# Patient Record
Sex: FEMALE | Race: OTHER | HISPANIC OR LATINO | Employment: FULL TIME | URBAN - METROPOLITAN AREA
[De-identification: names, ages, dates, MRNs, and addresses within clinical notes are randomized per-mention and may not be internally consistent; named-entity substitution may affect disease eponyms.]

---
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
## 2025-01-15 ENCOUNTER — OFFICE VISIT (OUTPATIENT)
Dept: URGENT CARE | Facility: PHYSICIAN GROUP | Age: 20
End: 2025-01-15
Payer: COMMERCIAL

## 2025-01-15 VITALS
SYSTOLIC BLOOD PRESSURE: 106 MMHG | DIASTOLIC BLOOD PRESSURE: 70 MMHG | RESPIRATION RATE: 12 BRPM | BODY MASS INDEX: 25.46 KG/M2 | OXYGEN SATURATION: 99 % | TEMPERATURE: 98.1 F | WEIGHT: 158.4 LBS | HEIGHT: 66 IN | HEART RATE: 91 BPM

## 2025-01-15 DIAGNOSIS — M79.10 MYALGIA DUE TO VIRAL INFECTION: ICD-10-CM

## 2025-01-15 DIAGNOSIS — R20.2 NUMBNESS AND TINGLING: ICD-10-CM

## 2025-01-15 DIAGNOSIS — B97.89 MYALGIA DUE TO VIRAL INFECTION: ICD-10-CM

## 2025-01-15 DIAGNOSIS — R20.0 NUMBNESS AND TINGLING: ICD-10-CM

## 2025-01-15 LAB
FLUAV RNA SPEC QL NAA+PROBE: NEGATIVE
FLUBV RNA SPEC QL NAA+PROBE: NEGATIVE
RSV RNA SPEC QL NAA+PROBE: NEGATIVE
S PYO DNA SPEC NAA+PROBE: NOT DETECTED
SARS-COV-2 RNA RESP QL NAA+PROBE: NEGATIVE

## 2025-01-15 PROCEDURE — 87651 STREP A DNA AMP PROBE: CPT

## 2025-01-15 PROCEDURE — 3074F SYST BP LT 130 MM HG: CPT

## 2025-01-15 PROCEDURE — 3078F DIAST BP <80 MM HG: CPT

## 2025-01-15 PROCEDURE — 0241U POCT CEPHEID COV-2, FLU A/B, RSV - PCR: CPT

## 2025-01-15 PROCEDURE — 99214 OFFICE O/P EST MOD 30 MIN: CPT

## 2025-01-15 RX ORDER — CYCLOBENZAPRINE HCL 5 MG
10 TABLET ORAL NIGHTLY PRN
Qty: 5 TABLET | Refills: 0 | Status: SHIPPED | OUTPATIENT
Start: 2025-01-15 | End: 2025-01-20

## 2025-01-15 ASSESSMENT — ENCOUNTER SYMPTOMS
JOINT SWELLING: 0
ABDOMINAL PAIN: 0
DIZZINESS: 0
PALPITATIONS: 0
FOCAL WEAKNESS: 0
TINGLING: 1
VERTIGO: 0
SHORTNESS OF BREATH: 0
COUGH: 0
VISUAL CHANGE: 0
CHANGE IN BOWEL HABIT: 0
WEAKNESS: 0
DIARRHEA: 0
MYALGIAS: 0
NUMBNESS: 0
EXTREMITY NUMBNESS: 1
HEADACHES: 1
DIAPHORESIS: 0
SENSORY CHANGE: 1
SORE THROAT: 0
LOSS OF CONSCIOUSNESS: 0
FALLS: 0
SWOLLEN GLANDS: 0
VOMITING: 0
ARTHRALGIAS: 0
BLURRED VISION: 0
FEVER: 0
NAUSEA: 0
FATIGUE: 0
CHILLS: 0
NECK PAIN: 0
ANOREXIA: 0

## 2025-01-15 NOTE — PROGRESS NOTES
Subjective:     Sarai Pepe is a 20 y.o. female who presents for Numbness (X3 days in left arm and left leg, denies injury)      Numbness  This is a new problem. The current episode started in the past 7 days. The problem occurs constantly. The problem has been unchanged. Associated symptoms include headaches. Pertinent negatives include no abdominal pain, anorexia, arthralgias, change in bowel habit, chest pain, chills, congestion, coughing, diaphoresis, fatigue, fever, joint swelling, myalgias, nausea, neck pain, numbness, rash, sore throat, swollen glands, urinary symptoms, vertigo, visual change, vomiting or weakness. Nothing aggravates the symptoms. She has tried nothing for the symptoms.       Review of Systems   Constitutional:  Negative for chills, diaphoresis, fatigue, fever and malaise/fatigue.   HENT:  Negative for congestion, ear discharge and sore throat.    Eyes:  Negative for blurred vision.   Respiratory:  Negative for cough and shortness of breath.    Cardiovascular:  Negative for chest pain, palpitations and leg swelling.   Gastrointestinal:  Negative for abdominal pain, anorexia, change in bowel habit, diarrhea, nausea and vomiting.   Musculoskeletal:  Negative for arthralgias, falls, joint swelling, myalgias and neck pain.   Skin:  Negative for itching and rash.   Neurological:  Positive for tingling, sensory change and headaches. Negative for dizziness, vertigo, focal weakness, loss of consciousness, weakness and numbness.        CURRENT MEDICATIONS:  azelastine  ofloxacin Soln  Tri-Lo-Oralia Tabs    Allergies:   No Known Allergies    Current Problems: Sarai Pepe does not have a problem list on file.  Past Surgical Hx:  No past surgical history on file.   Past Social Hx:  reports that she has never smoked. She has never used smokeless tobacco. She reports that she does not drink alcohol and does not use drugs.   Past Family Hx:  Sarai Pepe family history is  "not on file.     (Allergies, Medications, & Tobacco/Substance Use were reconciled by the Medical Assistant and reviewed by myself. The family history is prepopulated)       Objective:     /70 (BP Location: Right arm, Patient Position: Sitting, BP Cuff Size: Adult)   Pulse 91   Temp 36.7 °C (98.1 °F)   Resp 12   Ht 1.676 m (5' 6\")   Wt 71.8 kg (158 lb 6.4 oz)   SpO2 99%   BMI 25.57 kg/m²     Physical Exam  Constitutional:       General: She is not in acute distress.     Appearance: Normal appearance. She is not ill-appearing, toxic-appearing or diaphoretic.   HENT:      Head: Normocephalic and atraumatic.      Nose: Nose normal.      Mouth/Throat:      Pharynx: Oropharyngeal exudate and posterior oropharyngeal erythema present.   Eyes:      General: No visual field deficit or scleral icterus.        Right eye: No discharge.         Left eye: No discharge.   Cardiovascular:      Rate and Rhythm: Normal rate and regular rhythm.      Heart sounds: Normal heart sounds. No murmur heard.     No friction rub. No gallop.   Pulmonary:      Effort: Pulmonary effort is normal. No respiratory distress.      Breath sounds: No stridor. No wheezing, rhonchi or rales.   Chest:      Chest wall: No tenderness.   Abdominal:      General: Abdomen is flat.      Palpations: Abdomen is soft.   Musculoskeletal:         General: Normal range of motion.      Cervical back: No rigidity.   Skin:     General: Skin is warm and dry.   Neurological:      General: No focal deficit present.      Mental Status: She is alert and oriented to person, place, and time. Mental status is at baseline.      Cranial Nerves: Cranial nerves 2-12 are intact. No cranial nerve deficit, dysarthria or facial asymmetry.      Sensory: Sensation is intact.      Motor: Motor function is intact.      Coordination: Coordination is intact.      Gait: Gait is intact.      Deep Tendon Reflexes: Reflexes are normal and symmetric.   Psychiatric:         Behavior: " Behavior normal.         Judgment: Judgment normal.         Assessment/Plan:   Sarai was seen today for numbness.    Diagnoses and all orders for this visit:    Numbness and tingling  -     POCT CoV-2, Flu A/B, RSV by PCR  -     POCT GROUP A STREP, PCR    Myalgia due to viral infection  -     cyclobenzaprine (FLEXERIL) 5 mg tablet; Take 2 Tablets by mouth at bedtime as needed for Muscle Spasms or Moderate Pain for up to 5 days.     Based on history of presenting illness, review of systems and physical exam findings, most likely etiology of leg and arm sensations is myalgias due to viral illness. Considered MS, stroke, transient ischemic attack, patient is neurologically intact, symmetric strength on all sides.  Cranial nerves also intact.  No spinal injury or recent trauma.  She does have significant signs and symptoms consistent with a viral infection is largely asymptomatic however appears to be significant erythema as well as pharyngeal irritation.  Point-of-care testing for viral panel was obtained.  Strict return and ED precautions were discussed, may consider MRI should symptoms not subside. discussed symptomatic management with pharmacotherapy and over-the-counter medications.  On my exam I do not see any signs or symptoms consistent with a bacterial infection currently requiring oral antibiotics.  Discussed the risks the benefits and the indications of all new medications prescribed today.  Strict return and ED precautions were discussed and all questions were answered.     Differential diagnosis, natural history, supportive care, and indications for immediate follow-up discussed.    Advised the patient to follow-up with the primary care physician for recheck, reevaluation, and consideration of further management.    Please note that this dictation was created using voice recognition software. I have made reasonable attempt to correct obvious errors, but I expect that there are errors of grammar and  possibly content that I did not discover before finalizing the note.    This note was electronically signed by Denae Shea MD PhD